# Patient Record
Sex: MALE | Race: WHITE | NOT HISPANIC OR LATINO | URBAN - METROPOLITAN AREA
[De-identification: names, ages, dates, MRNs, and addresses within clinical notes are randomized per-mention and may not be internally consistent; named-entity substitution may affect disease eponyms.]

---

## 2023-03-09 ENCOUNTER — APPOINTMENT (RX ONLY)
Dept: URBAN - METROPOLITAN AREA CLINIC 236 | Facility: CLINIC | Age: 38
Setting detail: DERMATOLOGY
End: 2023-03-09

## 2023-03-09 DIAGNOSIS — B35.1 TINEA UNGUIUM: ICD-10-CM | Status: INADEQUATELY CONTROLLED

## 2023-03-09 DIAGNOSIS — L81.1 CHLOASMA: ICD-10-CM | Status: INADEQUATELY CONTROLLED

## 2023-03-09 PROCEDURE — ? NAIL CLIPPING FOR PATHOLOGY

## 2023-03-09 PROCEDURE — ? COUNSELING

## 2023-03-09 PROCEDURE — ? PRESCRIPTION MEDICATION MANAGEMENT

## 2023-03-09 PROCEDURE — 99203 OFFICE O/P NEW LOW 30 MIN: CPT

## 2023-03-09 PROCEDURE — ? PRESCRIPTION

## 2023-03-09 RX ORDER — PHARMACY COMPOUNDING ACCESSORY
EACH MISCELLANEOUS
Qty: 50 | Refills: 1 | Status: ERX | COMMUNITY
Start: 2023-03-09

## 2023-03-09 RX ORDER — EFINACONAZOLE 100 MG/ML
SOLUTION TOPICAL
Qty: 8 | Refills: 5 | Status: ERX | COMMUNITY
Start: 2023-03-09

## 2023-03-09 RX ADMIN — Medication: at 00:00

## 2023-03-09 RX ADMIN — EFINACONAZOLE: 100 SOLUTION TOPICAL at 00:00

## 2023-03-09 ASSESSMENT — LOCATION ZONE DERM
LOCATION ZONE: FACE
LOCATION ZONE: TOENAIL
LOCATION ZONE: FINGERNAIL

## 2023-03-09 ASSESSMENT — LOCATION SIMPLE DESCRIPTION DERM
LOCATION SIMPLE: SUPERIOR FOREHEAD
LOCATION SIMPLE: RIGHT GREAT TOE
LOCATION SIMPLE: RIGHT HAND

## 2023-03-09 ASSESSMENT — LOCATION DETAILED DESCRIPTION DERM
LOCATION DETAILED: SUPERIOR MID FOREHEAD
LOCATION DETAILED: RIGHT THUMBNAIL
LOCATION DETAILED: RIGHT GREAT TOENAIL

## 2023-03-09 NOTE — PROCEDURE: PRESCRIPTION MEDICATION MANAGEMENT
Detail Level: Zone
Initiate Treatment: Jublia applied to affected nails QD\\nVinegar soaks. Instructions given to patient
Render In Strict Bullet Format?: No
Plan: Patients did 7 weeks of griseofulvin with pcp states it helped a lot but is recurring
Initiate Treatment: Melasma compount (transexamic acid 5% and Konica acid 3%) apply to affected areas on face QHS for 3 month

## 2023-03-17 ENCOUNTER — RX ONLY (OUTPATIENT)
Age: 38
Setting detail: RX ONLY
End: 2023-03-17

## 2023-03-17 RX ORDER — UREA 400 MG/G
CREAM TOPICAL
Qty: 85 | Refills: 3 | Status: ERX | COMMUNITY
Start: 2023-03-17